# Patient Record
Sex: FEMALE
[De-identification: names, ages, dates, MRNs, and addresses within clinical notes are randomized per-mention and may not be internally consistent; named-entity substitution may affect disease eponyms.]

---

## 2020-12-09 ENCOUNTER — NURSE TRIAGE (OUTPATIENT)
Dept: OTHER | Facility: CLINIC | Age: 50
End: 2020-12-09

## 2020-12-10 NOTE — TELEPHONE ENCOUNTER
Reason for Disposition   [1] SEVERE pain (e.g., excruciating) AND [2] present > 1 hour    Answer Assessment - Initial Assessment Questions  1. LOCATION: \"Where does it hurt? \"       HDQ and in back  2. RADIATION: \"Does the pain shoot anywhere else? \" (e.g., chest, back)      No just cramps  3. ONSET: \"When did the pain begin? \" (e.g., minutes, hours or days ago)       12/8/2020  4. SUDDEN: \"Gradual or sudden onset? \"      Suddenly   5. PATTERN \"Does the pain come and go, or is it constant? \"     - If constant: \"Is it getting better, staying the same, or worsening? \"       (Note: Constant means the pain never goes away completely; most serious pain is constant and it progresses)      - If intermittent: \"How long does it last?\" \"Do you have pain now? \"      (Note: Intermittent means the pain goes away completely between bouts)      Constant today  6. SEVERITY: \"How bad is the pain? \"  (e.g., Scale 1-10; mild, moderate, or severe)    - MILD (1-3): doesn't interfere with normal activities, abdomen soft and not tender to touch     - MODERATE (4-7): interferes with normal activities or awakens from sleep, tender to touch     - SEVERE (8-10): excruciating pain, doubled over, unable to do any normal activities       8/10  7. RECURRENT SYMPTOM: \"Have you ever had this type of abdominal pain before? \" If so, ask: \"When was the last time? \" and \"What happened that time? \"       A long time ago, menstrual cramps  8. CAUSE: \"What do you think is causing the abdominal pain? \"      Unsure she has fibroids   9. RELIEVING/AGGRAVATING FACTORS: \"What makes it better or worse? \" (e.g., movement, antacids, bowel movement)      Not really  10. OTHER SYMPTOMS: \"Has there been any vomiting, diarrhea, constipation, or urine problems? \"        no  11. PREGNANCY: \"Is there any chance you are pregnant? \" \"When was your last menstrual period? \"        no    Protocols used: ABDOMINAL PAIN - [facility]    Caller states she started having LRQ pain 12/8/2020 that goes to her back, it is constant 8/10. Caller states she has 3 fibroids in her uterus and that could be causing the pain. Caller is due to start her menstrual period next week. No other signs and symptoms at this time. Care Advice provided, patient acknowledged understanding of care advice and is not in agreement with plan. Patient has no further questions, instructed to call back for any new or worsening symptoms. Recommendation Go to ED Now. Caller declined disposition and will treat at home.